# Patient Record
Sex: MALE | Race: WHITE | NOT HISPANIC OR LATINO | URBAN - METROPOLITAN AREA
[De-identification: names, ages, dates, MRNs, and addresses within clinical notes are randomized per-mention and may not be internally consistent; named-entity substitution may affect disease eponyms.]

---

## 2017-03-24 ENCOUNTER — OFFICE VISIT - HEALTHEAST (OUTPATIENT)
Dept: INTERNAL MEDICINE | Facility: CLINIC | Age: 37
End: 2017-03-24

## 2017-03-24 DIAGNOSIS — M77.11 LATERAL EPICONDYLITIS OF RIGHT ELBOW: ICD-10-CM

## 2017-03-24 DIAGNOSIS — E61.1 IRON DEFICIENCY: ICD-10-CM

## 2017-03-24 DIAGNOSIS — Z00.00 HEALTHCARE MAINTENANCE: ICD-10-CM

## 2017-03-24 LAB
CHOLEST SERPL-MCNC: 152 MG/DL
FASTING STATUS PATIENT QL REPORTED: NO
HDLC SERPL-MCNC: 45 MG/DL
LDLC SERPL CALC-MCNC: 90 MG/DL
TRIGL SERPL-MCNC: 86 MG/DL

## 2017-03-24 ASSESSMENT — MIFFLIN-ST. JEOR: SCORE: 1758.35

## 2017-03-27 ENCOUNTER — OFFICE VISIT - HEALTHEAST (OUTPATIENT)
Dept: PHYSICAL THERAPY | Facility: REHABILITATION | Age: 37
End: 2017-03-27

## 2017-03-27 ENCOUNTER — COMMUNICATION - HEALTHEAST (OUTPATIENT)
Dept: INTERNAL MEDICINE | Facility: CLINIC | Age: 37
End: 2017-03-27

## 2017-03-27 ENCOUNTER — AMBULATORY - HEALTHEAST (OUTPATIENT)
Dept: INTERNAL MEDICINE | Facility: CLINIC | Age: 37
End: 2017-03-27

## 2017-03-27 DIAGNOSIS — R29.898 DECREASED GRIP STRENGTH OF RIGHT HAND: ICD-10-CM

## 2017-03-27 DIAGNOSIS — M25.621 DECREASED ROM OF RIGHT ELBOW: ICD-10-CM

## 2017-03-27 DIAGNOSIS — M77.11 RIGHT LATERAL EPICONDYLITIS: ICD-10-CM

## 2017-03-27 DIAGNOSIS — E61.1 IRON DEFICIENCY: ICD-10-CM

## 2017-03-27 DIAGNOSIS — R74.8 ABNORMAL LIVER ENZYMES: ICD-10-CM

## 2018-03-01 ENCOUNTER — OFFICE VISIT - HEALTHEAST (OUTPATIENT)
Dept: INTERNAL MEDICINE | Facility: CLINIC | Age: 38
End: 2018-03-01

## 2018-03-01 DIAGNOSIS — D50.9 IRON DEFICIENCY ANEMIA: ICD-10-CM

## 2018-03-01 DIAGNOSIS — N48.6 PEYRONIE'S SYNDROME: ICD-10-CM

## 2018-03-01 DIAGNOSIS — R74.8 ABNORMAL LIVER ENZYMES: ICD-10-CM

## 2018-03-01 LAB
ALBUMIN SERPL-MCNC: 4.3 G/DL (ref 3.5–5)
ALP SERPL-CCNC: 74 U/L (ref 45–120)
ALT SERPL W P-5'-P-CCNC: 28 U/L (ref 0–45)
AST SERPL W P-5'-P-CCNC: 56 U/L (ref 0–40)
BILIRUB DIRECT SERPL-MCNC: 0.2 MG/DL
BILIRUB SERPL-MCNC: 0.6 MG/DL (ref 0–1)
ERYTHROCYTE [DISTWIDTH] IN BLOOD BY AUTOMATED COUNT: 12.3 % (ref 11–14.5)
FERRITIN SERPL-MCNC: 21 NG/ML (ref 27–300)
HCT VFR BLD AUTO: 46.3 % (ref 40–54)
HGB BLD-MCNC: 15.9 G/DL (ref 14–18)
MCH RBC QN AUTO: 31.2 PG (ref 27–34)
MCHC RBC AUTO-ENTMCNC: 34.3 G/DL (ref 32–36)
MCV RBC AUTO: 91 FL (ref 80–100)
PLATELET # BLD AUTO: 284 THOU/UL (ref 140–440)
PMV BLD AUTO: 8.2 FL (ref 7–10)
PROT SERPL-MCNC: 7.1 G/DL (ref 6–8)
RBC # BLD AUTO: 5.08 MILL/UL (ref 4.4–6.2)
WBC: 4.7 THOU/UL (ref 4–11)

## 2018-03-01 ASSESSMENT — MIFFLIN-ST. JEOR: SCORE: 1774.68

## 2018-03-02 LAB
HBV SURFACE AG SERPL QL IA: NEGATIVE
HCV AB SERPL QL IA: NEGATIVE

## 2018-03-11 ENCOUNTER — AMBULATORY - HEALTHEAST (OUTPATIENT)
Dept: INTERNAL MEDICINE | Facility: CLINIC | Age: 38
End: 2018-03-11

## 2018-03-11 DIAGNOSIS — R74.8 ABNORMAL LIVER ENZYMES: ICD-10-CM

## 2018-03-12 ENCOUNTER — COMMUNICATION - HEALTHEAST (OUTPATIENT)
Dept: INTERNAL MEDICINE | Facility: CLINIC | Age: 38
End: 2018-03-12

## 2018-03-16 ENCOUNTER — HOSPITAL ENCOUNTER (OUTPATIENT)
Dept: ULTRASOUND IMAGING | Facility: CLINIC | Age: 38
Discharge: HOME | End: 2018-03-16
Attending: INTERNAL MEDICINE

## 2018-03-16 DIAGNOSIS — R74.8 ABNORMAL LIVER ENZYMES: ICD-10-CM

## 2018-03-23 ENCOUNTER — RECORDS - HEALTHEAST (OUTPATIENT)
Dept: ADMINISTRATIVE | Facility: OTHER | Age: 38
End: 2018-03-23

## 2018-04-09 ENCOUNTER — COMMUNICATION - HEALTHEAST (OUTPATIENT)
Dept: SCHEDULING | Facility: CLINIC | Age: 38
End: 2018-04-09

## 2021-05-30 VITALS — HEIGHT: 71 IN | BODY MASS INDEX: 25.75 KG/M2 | WEIGHT: 183.9 LBS

## 2021-06-01 VITALS — BODY MASS INDEX: 26.25 KG/M2 | WEIGHT: 187.5 LBS | HEIGHT: 71 IN

## 2021-06-09 NOTE — PROGRESS NOTES
[facility]   Initial Evaluation    Patient Name: Jelani Rizvi  Date of evaluation: 3/27/2017  PRECAUTIONS: none  Referral Diagnosis: Lateral epicondylitis of right elbow  Referring provider: Mahesh Brand MD  Visit Diagnosis:     ICD-10-CM    1. Right lateral epicondylitis M77.11 PT iontophoresis   2. Decreased ROM of right elbow M25.621 PT iontophoresis   3. Decreased  strength of right hand M62.81 PT iontophoresis       Assessment:      Pt. is appropriate for skilled PT intervention as outlined in the Plan of Care (POC).  Pt. is a good candidate for skilled PT services to improve pain levels and function.  Signs/sx consistent with right lateral epicondylitis. Of note, patient also with R-sided upper trap/levator scap pain. Question if this is muscle spasm/pain as a  result of substitution due to R elbow pain. With discussion of diagnosis, prognosis, POC, and basis for treatment, patient is electing to continue with independent self-management. Encouraged to follow-up with PT if no further improvement or worsening of sx occurs within the next 3-4 weeks. Discussed with patient the possible option to include ionto treatments in future visits as well.    Goals:  Pt. will be independent with home exercise program in : 6 weeks  Pt. will have improved quality of sleep: waking less times/night;in 6 weeks  Patient will decrease : by _ points;for improved quality of life;in 6 weeks  by ___ points: >= 15  Pt will: be able to bathe/dress without R elbow pain in 6 weeks.  Pt will: be able to tolerate a full day's worth of work at the computer, including mousing, without increased R elbow pain in 6 weeks.    Patient's expectations/goals are realistic.    Barriers to Learning or Achieving Goals:  No Barriers.       Plan / Patient Instructions:        Plan of Care:   Authorization / Certification Number of Visits: 12  Communication with: Referral Source  Patient Related Instruction: Nature of  "Condition;Treatment plan and rationale;Self Care instruction;Basis of treatment;Body mechanics;Posture;Precautions;Next steps;Expected outcome  Times per Week: 1-2  Number of Weeks: 4-8  Number of Visits: 12  Precautions / Restrictions : none  Therapeutic Exercise: ROM;Stretching;Strengthening  Neuromuscular Reeducation: kinesio tape  Manual Therapy: soft tissue mobilization;myofascial release  Modalities: iontophoresis;ultrasound;cold pack    Plan for next visit: Pt would like to continue with independent self-management through HEP. Encouraged to follow-up with PT if no further improvement or worsening of sx occurs within the next 3-4 weeks.     Subjective:       History of Present Illness:    Jelani is a 36 y.o. male who presents to therapy today with complaints of acute right elbow pain, as well as R upper trap/levator scap pain that feels like muscle spasm, or a knot, per patient report.   Onset: Dec. 2016. Insidious and gradual onset  Duration: Constant, some days better than others. Pt does admit that he has a hard time deciphering what makes the pain better or worse at this point. He does relate to his non-ergonomic work set-up, but does not feel this to be the cause, as he has worked that job for at least 8 or 9 years.  Worse with flexing and extending the elbow, lifting, dressing, typing/writing, gripping, mousing on the computer at work (works as a  for the [employer]).  Better with newly acquired tennis elbow brace, given to him by his PCP.  Pain Medication: -  Sleep: Reports waking 1-2x/night on average due to pain, usually if the elbow is stuck in a flexed position for an extended period of time.    Pt seeks PT to \"fix my elbow pain.\"    Pain Ratin  Pain rating at best: 2  Pain rating at worst: 8  Pain description: aching, dull, pain, soreness and weakness     Objective:      Patient Outcome Measures :    QuickDASH Score: 30   Scores range from 0-100, where a score of 0 represents " "minimal pain and maximal function. The minimal clinically important difference is a negative chagne of 16 points.     Examination  1. Right lateral epicondylitis  PT iontophoresis   2. Decreased ROM of right elbow  PT iontophoresis   3. Decreased  strength of right hand  PT iontophoresis     Precautions/Restrictions: None  Involved side: Right  Posture Observation:      General sitting posture is  normal.  General standing posture is normal.    Cervical AROM: WFL t/o minus moderate loss of extension. Pt reports + \"pull\" in R upper trap/levator scap area with extension, L SB, and L ROT.  B Shoulder AROM WFL, although patient requires a self-postural correction in order to be able to complete full active ABD bilaterally.  Elbow / Wrist ROM  Date:      Elbow and wrist  ROM ( )   AROM in degrees AROM in degrees AROM in degrees    Right Left Right Left Right Left   Elbow Flexion (150 ) 120 WNL       Elbow Extension (0 ) ~-10 WNL       Forearm Supination (0-90 )         Forearm Pronation (0-90 )         Wrist Flexion (0-80 ) WNL        Wrist Extension (0-70 ) WNL        Wrist Radial Deviation (0-20 )         Wrist Ulnar Deviation (0-30 ) + ERP         PROM in degrees PROM in degrees PROM in degrees    Right Left Right Left Right Left   Elbow Flexion (150 )         Elbow Extension (0 ) ~-10        Forearm Supination (0-90 ) WNL with mild ERP        Forearm Pronation (0-90 ) WNL with mild ERP        Wrist Flexion (0-80 )         Wrist Extension (0-70 )         Wrist Radial Deviation (0-20 )         Wrist Ulnar Deviation (0-30 )           Elbow / Wrist Special Tests:  Elbow Right(+/-) Left (+/-) Wrist Right (+/-) Left (+/-)   Elbow extension   Axial load of thumb     Valgus stress   Scaphoid shift test     Varus stress   Finkelstein s test     Moving valgus stress   Carpal compression     Tinel s test   Tinel s test     Ulnar nerve compression   Phalen s test     Lateralepicondylalgia  cluster.  Pain with:   Clinical " prediction rule for CTS:       Palpation of Lat. Epi. +    Age >45       Resisted wrist extension +    Shaking hands relieves symptoms       Resisted middle finger extension +    Wrist Ratio Index >0.67     Other:     Reduced Sensory Field First Digit     Other:   TFCC Lift Test     Other   TFCC Load Test          Strength: L 75-70-75 lb, R 70-60-60 lb (R hand dominant).        Treatment Today     TREATMENT MINUTES COMMENTS   Evaluation 20    Self-care/ Home management     Manual therapy     Neuromuscular Re-education     Therapeutic Activity     Therapeutic Exercises 15 -Discussed diagnosis, prognosis, POC, relevant anatomy and basis for treatment.  -Reviewed and performed HEP with handouts provided.  -Reviewed proper brace wear.  -Reviewed proper use of ice application to the elbow.  -handout provided for home KTape application if patient would like to trial.  -Briefly discussed possible option to include ionto in future visits if the patient is agreeable.   Gait training     Modality__________________                Total 35    Blank areas are intentional and mean the treatment did not include these items.            PT Evaluation Code: (Please list factors)  Patient History/Comorbidities: none  Examination: R elbow/R shoulder  Clinical Presentation: Stable  Clinical Decision Making: Low    Patient History/  Comorbidities Examination  (body structures and functions, activity limitations, and/or participation restrictions) Clinical Presentation Clinical Decision Making (Complexity)   No documented Comorbidities or personal factors 1-2 Elements Stable and/or uncomplicated Low   1-2 documented comorbidities or personal factor 3 Elements Evolving clinical presentation with changing characteristics Moderate   3-4 documented comorbidities or personal factors 4 or more Unstable and unpredictable High           Adam Maynardabe  3/27/2017  11:32 AM    [facility] Discharge Summary  Patient Name: Jelani SR  Focke  Date: 5/2/2017  Referral Diagnosis: Lateral epicondylitis of right elbow  Referring provider: Mahesh Brand MD  Visit Diagnosis:   1. Right lateral epicondylitis  PT iontophoresis   2. Decreased ROM of right elbow  PT iontophoresis   3. Decreased  strength of right hand  PT iontophoresis       Goals: *Unable to comment on completion of goals due to lack of further follow-up with PT.  Pt. will be independent with home exercise program in : 6 weeks  Pt. will have improved quality of sleep: waking less times/night;in 6 weeks  Patient will decrease : by _ points;for improved quality of life;in 6 weeks  by ___ points: >= 15  Pt will: be able to bathe/dress without R elbow pain in 6 weeks.  Pt will: be able to tolerate a full day's worth of work at the computer, including mousing, without increased R elbow pain in 6 weeks.    Patient was seen for initial evaluation and treatment on 03/27/17 only.   Patient received a home program .  The patient discontinued therapy, did not return.    Therapy will be discontinued at this time.  The patient will need a new referral to resume.    Thank you for your referral.  Adam Rahman  5/2/2017  9:02 AM

## 2021-06-09 NOTE — PROGRESS NOTES
ASSESSMENT:  1.  Health maintenance:  Body fat is excellent at 14.1%.  Jelani has excellent health maintenance habits.  He exercises regularly.  He is training for a triathlon.  2.  History of iron deficiency:  He is primarily a vegetarian.  Hemoglobin and iron studies will be checked.  3.  Right lateral epicondylitis:  He wonders if this could be related to using the moles with his computer.  He has not been lifting weights or doing heavy activities with the arm    PLAN:  1.  Tennis elbow splint  2.  Try Aleve 2 twice daily with food for one week then as needed for lateral epicondylitis  3.  Schedule therapy for lateral epicondylitis  4.  Labs as outlined for health maintenance and to assess for iron deficiency  5.  See in 1 year or as needed      There are no discontinued medications.        ASSESSED PROBLEMS:  No diagnosis found.    CHIEF COMPLAINT:  Chief Complaint   Patient presents with     Annual Exam     Pain     right elbow pain for past few months       HISTORY OF PRESENT ILLNESS:  Jelani is a 36 y.o. male presenting to the clinic today for a physical exam. Overall, he has felt very well.     Elbow pain: He reports that about 2 months ago his right elbow began to bother him. Initially, he believed he had slept on the elbow oddly but the pain became progressive. He cites that this morning while trying to lift his water bottle his  felt weaker as the bottle came towards his mouth. Of note, he has been swimming and biking more, but lifting weights less. He has been unable to fully flex his tricep due to the pain. He endorses occasional numbness in his fingers and when he bumps it experiences severe, shooting pain. He is often using a mouse at work. The pain is provoked by certain movements, prompting him to almost drop a few things. He has been using a stress ball to increase strength in his hand. He tried taking aleve but did not notice much benefit. He denies any left elbow pain.     REVIEW OF  SYSTEMS:   Anemia:  After complaining of fatigue last year and finding that his hemoglobin levels were low he began to take iron supplements for about 3 months and felt much better, though stopped when he felt adequately recharged. He started taking iron supplements a few weeks ago and has felt extremely energized and very impressed with the effect.   All other systems are negative.    PFSH:  He has been biking frequently and swimming often. He does play by play for the [employer].   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   He is vegetarian but has been eating more eggs.   He works as an  for the [employer]  and [employer]  History   Smoking Status     Never Smoker   Smokeless Tobacco     Not on file       Family History   Problem Relation Age of Onset     No Medical Problems Mother      No Medical Problems Father        Social History     Social History     Marital status: Single     Spouse name: N/A     Number of children: N/A     Years of education: N/A     Occupational History     Not on file.     Social History Main Topics     Smoking status: Never Smoker     Smokeless tobacco: Not on file     Alcohol use Yes     Drug use: No     Sexual activity: Not on file     Other Topics Concern     Not on file     Social History Narrative    ** Merged History Encounter **            Past Surgical History:   Procedure Laterality Date     APPENDECTOMY  1994     moles      removed from back     VT REPAIR SHLDR CAPSU,POST,RECUR DISLOC      Description: Posterior Capsulorraphy For Shoulder Dislocation;  Recorded: 11/29/2008;     SHOULDER SURGERY Left     2000       Allergies   Allergen Reactions     Sulfa (Sulfonamide Antibiotics)      Since childhood       Active Ambulatory Problems     Diagnosis Date Noted     Acne      Serum Enzyme Levels - AST (SGOT) Elevated      Allergic Rhinitis      Cerumen Impaction In The Left Ear      Resolved Ambulatory Problems     Diagnosis Date Noted     No Resolved  "Ambulatory Problems     No Additional Past Medical History       VITALS:  Vitals:    03/24/17 1125   BP: 120/82   Patient Site: Left Arm   Patient Position: Sitting   Cuff Size: Adult Regular   Pulse: 60   Weight: 183 lb 14.4 oz (83.4 kg)   Height: 5' 10.5\" (1.791 m)     Wt Readings from Last 3 Encounters:   03/24/17 183 lb 14.4 oz (83.4 kg)   04/12/16 192 lb (87.1 kg)   08/18/15 186 lb 9.6 oz (84.6 kg)       PHYSICAL EXAM:  Constitutional:   Reveals an alert, pleasant pleasant young male. Does not appear acutely ill. Affect appropriate. Body fat percentage:  14.7% Vitals: per nursing notes.  HEENT:  Ears:  External canals, TMs clear.    Eyes:  EOMs full, PERRL.  Oropharynx:   Mouth and throat clear, no thrush or exudate.  Neck:  Supple, no carotid bruits or adenopathy.  Back:  No spine or CVA pain.  Thorax:  No bony deformities.  Lungs: Clear to A&P without rales or wheezes.  Respiratory effort normal.  Cardiac:   Regular rate and rhythm, normal S1, S2, no murmur or gallop.  Abdomen:  Soft,small right lower abdominal midline scar, active bowel sounds without bruits, mass, or tenderness.  Extremities:   Several dystrophic toenails. No peripheral edema, pulses in the feet intact.    Skin:  Old acne scarring on the back. No jaundice, peripheral cyanosis or lesions to suggest malignancy.  Neuro:  Alert and oriented. Cranial nerves, motor, sensory exams are intact.  No gross focal deficits.  Psychiatric:  Memory intact, mood appropriate.    QUALITY MEASURES:  The following high BMI interventions were performed this visit: encouragement to exercise    ADDITIONAL HISTORY SUMMARIZED (2): Notes from Dr. Dan reviewed from 5/2016 regarding vasovagal sycnope.  DECISION TO OBTAIN EXTRA INFORMATION (1): None.   RADIOLOGY TESTS (1): None.  LABS (1): Labs ordered.  MEDICINE TESTS (1): Echocardiogram reviewed from 5/2016.  INDEPENDENT REVIEW (2 each): None.     The visit lasted a total of 36 minutes face to face with the patient. " Over 50% of the time was spent counseling and educating the patient about lateral epicondylitis.    I, Azael Hunt, am scribing for and in the presence of, Dr. Brand.    I, Dr. Brand, personally performed the services described in this documentation, as scribed by Azael Hunt in my presence, and it is both accurate and complete.    Dragon dictation was used for this note.  Speech recognition errors are a possibility.    MEDICATIONS:  Current Outpatient Prescriptions   Medication Sig Dispense Refill     cetirizine (ZYRTEC) 10 MG tablet Take 10 mg by mouth daily.       FERROUS SULFATE, DRIED (IRON, DRIED, ORAL) Take 1 tablet by mouth daily.       No current facility-administered medications for this visit.        Total data points:  4.

## 2021-06-16 NOTE — PROGRESS NOTES
ASSESSMENT:  1.  Curvature of penis: This could possibly be related to a traumatic injury.  Peyronie's disease is of concern.  A urologic referral would be advised.    2.  History of iron deficiency anemia: He used an iron supplement after last visit, but tolerated it poorly.  He has gone off a vegetarian diet and is now eating meat.  Ferritin and hemoglobin will be checked.    3.  Abnormal liver enzymes: Has had a mild elevation in the AST in the past.  This will be rechecked.  If persistent elevations are noted, further evaluation is indicated    PLAN:  1.  Urology consultation  2.  Check liver enzymes, hemogram and ferritin  3.  He will be notified of test results.  See for yearly exam or as needed.    Orders Placed This Encounter   Procedures     Hepatic Profile     Ferritin     HM2(CBC w/o Differential)     Ambulatory referral to Urology     Referral Priority:   Routine     Referral Type:   Consultation     Referral Reason:   Evaluation and Treatment     Requested Specialty:   Urology     Number of Visits Requested:   1     Medications Discontinued During This Encounter   Medication Reason     FERROUS SULFATE, DRIED (IRON, DRIED, ORAL) Therapy completed     ferrous sulfate 325 (65 FE) MG tablet Therapy completed           ASSESSED PROBLEMS:  1. Peyronie's syndrome  Ambulatory referral to Urology   2. Abnormal liver enzymes  Hepatic Profile   3. Iron deficiency anemia  Ferritin    HM2(CBC w/o Differential)       CHIEF COMPLAINT:  Chief Complaint   Patient presents with     Penis Pain     Started in  november        HISTORY OF PRESENT ILLNESS:  Jelani is a 37 y.o. male presenting to the clinic today with concerns regarding curvature of his penis.  He first noted this several months ago.  He notes that part of the penis appears soft where another part is erect.  He has noted increased discomfort with intercourse.    He was noted to be iron deficient with labs last year.  He tolerated an iron supplement  poorly.  Has gone back to eating meat from a previous vegetarian diet.    He did have a mild elevation in AST noted with last labs in another visits.  He did not return for follow-up studies.  A hepatic profile will be checked again today.  There is no history of excessive alcohol intake.  He has been an avid endurance exerciser.    REVIEW OF SYSTEMS:   All other systems are negative.    PFSH:  .  Works in [employer] for [employer]    TOBACCO USE:  History   Smoking Status     Never Smoker   Smokeless Tobacco     Not on file       VITALS:  Vitals:    03/01/18 1044   BP: 108/60   Patient Site: Left Arm   Patient Position: Sitting   Cuff Size: Adult Regular   Pulse: 66   Weight: 187 lb 8 oz (85 kg)     Wt Readings from Last 3 Encounters:   03/01/18 187 lb 8 oz (85 kg)   03/24/17 183 lb 14.4 oz (83.4 kg)   04/12/16 192 lb (87.1 kg)     Body mass index is 26.52 kg/(m^2).      MEDICATIONS:  Current Outpatient Prescriptions   Medication Sig Dispense Refill     cetirizine (ZYRTEC) 10 MG tablet Take 10 mg by mouth daily.       No current facility-administered medications for this visit.        PHYSICAL EXAM:  Constitutional:   Reveals an alert, pleasant, talkative young man. Affect appropriate. Does not seem acutely ill. Vitals: per nursing notes.  : Circumcised. No penile lesions. Testes without masses. No inguinal hernia.   Abdomen:  Soft, active bowel sounds without bruits, mass, or tenderness.      ADDITIONAL HISTORY SUMMARIZED (2): None.  DECISION TO OBTAIN EXTRA INFORMATION (1): None.   RADIOLOGY TESTS (1): None.  LABS (1): Labs ordered.   MEDICINE TESTS (1): None.  INDEPENDENT REVIEW (2 each): None.     The visit lasted a total of 18 minutes face to face with the patient. Over 50% of the time was spent counseling and educating the patient about erectile issues.      Dragon dictation was used for this note.  Speech recognition errors are a possibility.      Total data points:2

## 2021-07-03 NOTE — ADDENDUM NOTE
Addendum Note by Mahesh Capone MD at 3/1/2018  7:35 PM     Author: Mahesh Capone MD Service: -- Author Type: Physician    Filed: 3/1/2018  7:35 PM Encounter Date: 3/1/2018 Status: Signed    : Mahesh Capone MD (Physician)    Addended by: MAHESH CAPONE on: 3/1/2018 07:35 PM        Modules accepted: Orders

## 2021-08-21 ENCOUNTER — HEALTH MAINTENANCE LETTER (OUTPATIENT)
Age: 41
End: 2021-08-21

## 2021-10-16 ENCOUNTER — HEALTH MAINTENANCE LETTER (OUTPATIENT)
Age: 41
End: 2021-10-16

## 2022-09-25 ENCOUNTER — HEALTH MAINTENANCE LETTER (OUTPATIENT)
Age: 42
End: 2022-09-25

## 2023-10-14 ENCOUNTER — HEALTH MAINTENANCE LETTER (OUTPATIENT)
Age: 43
End: 2023-10-14